# Patient Record
Sex: FEMALE | Race: BLACK OR AFRICAN AMERICAN | NOT HISPANIC OR LATINO | URBAN - METROPOLITAN AREA
[De-identification: names, ages, dates, MRNs, and addresses within clinical notes are randomized per-mention and may not be internally consistent; named-entity substitution may affect disease eponyms.]

---

## 2023-01-20 PROBLEM — A41.9 SEVERE SEPSIS: Status: ACTIVE | Noted: 2023-01-20

## 2023-01-20 PROBLEM — I10 HTN (HYPERTENSION): Status: ACTIVE | Noted: 2023-01-20

## 2023-01-20 PROBLEM — M10.9 GOUT: Status: ACTIVE | Noted: 2023-01-20

## 2023-01-20 PROBLEM — R65.20 SEVERE SEPSIS: Status: ACTIVE | Noted: 2023-01-20

## 2023-01-20 PROBLEM — I50.32 CHRONIC DIASTOLIC HEART FAILURE: Status: ACTIVE | Noted: 2023-01-20

## 2023-01-20 PROBLEM — N39.0 UTI (URINARY TRACT INFECTION): Status: ACTIVE | Noted: 2023-01-20

## 2023-01-20 PROBLEM — L03.90 CELLULITIS: Status: ACTIVE | Noted: 2023-01-20

## 2023-01-20 PROBLEM — R11.2 NAUSEA AND VOMITING: Status: ACTIVE | Noted: 2023-01-20

## 2023-01-20 PROBLEM — N18.9 CKD (CHRONIC KIDNEY DISEASE): Status: ACTIVE | Noted: 2023-01-20

## 2023-01-21 PROBLEM — N93.9 VAGINAL BLEEDING: Status: ACTIVE | Noted: 2023-01-21

## 2023-01-21 PROBLEM — D62 ACUTE BLOOD LOSS ANEMIA: Status: ACTIVE | Noted: 2023-01-21

## 2023-01-21 PROBLEM — D50.9 MICROCYTIC ANEMIA: Status: ACTIVE | Noted: 2023-01-21

## 2023-01-21 PROBLEM — L03.115 BILATERAL LOWER LEG CELLULITIS: Status: ACTIVE | Noted: 2023-01-21

## 2023-01-21 PROBLEM — R59.0 RETROPERITONEAL LYMPHADENOPATHY: Status: ACTIVE | Noted: 2023-01-21

## 2023-01-21 PROBLEM — R79.89 POSITIVE D DIMER: Status: ACTIVE | Noted: 2023-01-21

## 2023-01-21 PROBLEM — L03.116 BILATERAL LOWER LEG CELLULITIS: Status: ACTIVE | Noted: 2023-01-21

## 2023-01-22 PROBLEM — R19.7 DIARRHEA: Status: ACTIVE | Noted: 2023-01-22

## 2023-01-22 PROBLEM — R65.20 SEVERE SEPSIS: Status: RESOLVED | Noted: 2023-01-20 | Resolved: 2023-01-22

## 2023-01-22 PROBLEM — A41.9 SEVERE SEPSIS: Status: RESOLVED | Noted: 2023-01-20 | Resolved: 2023-01-22

## 2023-01-23 PROBLEM — N17.9 AKI (ACUTE KIDNEY INJURY): Status: ACTIVE | Noted: 2023-01-23

## 2023-01-23 PROBLEM — R11.2 NAUSEA AND VOMITING: Status: RESOLVED | Noted: 2023-01-20 | Resolved: 2023-01-23

## 2023-01-24 PROBLEM — I50.32 CHRONIC DIASTOLIC HEART FAILURE: Status: ACTIVE | Noted: 2023-01-24

## 2023-01-25 ENCOUNTER — PATIENT OUTREACH (OUTPATIENT)
Dept: ADMINISTRATIVE | Facility: CLINIC | Age: 58
End: 2023-01-25

## 2023-01-25 NOTE — PROGRESS NOTES
C3 nurse attempted to contact Mora Mckeon  for a TCC post hospital discharge follow up call. No answer. No voicemail available.The patient does not have a scheduled HOSFU appointment. Message sent to PCP staff for assistance with scheduling visit with patient.

## 2023-01-26 RX ORDER — NIFEDIPINE 60 MG/1
30 TABLET, EXTENDED RELEASE ORAL DAILY
COMMUNITY

## 2023-01-26 NOTE — PROGRESS NOTES
2nd attempt-C3 nurse attempted to contact Mora Mckeon  for a TCC post hospital discharge follow up call. No answer. Left message with Jean Gerard, patient's daughter, with callback information. The patient does not have a scheduled HOSFU appointment.

## 2023-01-26 NOTE — PROGRESS NOTES
C3 nurse spoke with Mora Mckeon  for a TCC post hospital discharge follow up call. The patient has a scheduled [facility] appointment with Neida Greene MD, a non-[facility] PCP, on 01/27/2023 @ [facility].

## 2023-04-24 PROBLEM — N39.0 UTI (URINARY TRACT INFECTION): Status: RESOLVED | Noted: 2023-01-20 | Resolved: 2023-04-24

## 2023-04-24 PROBLEM — N17.9 AKI (ACUTE KIDNEY INJURY): Status: RESOLVED | Noted: 2023-01-23 | Resolved: 2023-04-24
